# Patient Record
Sex: FEMALE | Race: WHITE
[De-identification: names, ages, dates, MRNs, and addresses within clinical notes are randomized per-mention and may not be internally consistent; named-entity substitution may affect disease eponyms.]

---

## 2019-10-17 ENCOUNTER — HOSPITAL ENCOUNTER (OUTPATIENT)
Dept: HOSPITAL 84 - D.HCCARDIO | Age: 54
Discharge: HOME | End: 2019-10-17
Attending: INTERNAL MEDICINE
Payer: COMMERCIAL

## 2019-10-17 VITALS — BODY MASS INDEX: 22.5 KG/M2

## 2019-10-17 DIAGNOSIS — I20.9: Primary | ICD-10-CM

## 2019-10-18 NOTE — ST
PATIENT:LEVON HIDALGO                            MEDICAL RECORD: [id number]
SEX: F                                                   LOCATION:[facility]         
ORDER #:                                                 ADMISSION DATE: 10/17/19
AGE OF PATIENT: 54            
 
REFERRING PHYSICIAN:                                                             
 
INTERPRETING PHYSICIAN: YISEL MANCIA MD             

 
 
DATE OF SERVICE:  10/17/2019
 
PROCEDURE:  Nuclear stress test.
 
INDICATIONS:  Angina, hyperlipidemia.
 
The patient was exercised on standard Kofi protocol for 10 minutes achieving
85% of max target heart rate response with 33 mCi of sestamibi injected at peak
stress, 11 mCi used previously for rest images.
 
FINDINGS:  Gated SPECT reveals a preserved ejection fraction of greater than 80%
with good wall motion and thickening and brightening throughout all segments. 
SPECT imaging:  Cardiolite was used as myocardial perfusion agent.  There is
reversible ischemia anteriorly, inferiorly, and apically.  The anterior includes
the basal, mid, apical anterior segments.  This is mild reversibility. 
Inferiorly includes basal, mid, apical inferior segments as well as the apex
itself.  The degree of reversibility is moderate inferiorly.  The amount of
myocardium involved between the two is large.
 
OVERALL IMPRESSION:  This is an abnormal nuclear stress test that is high risk. 
Large area of reversible ischemia anteriorly, inferiorly, and apically
suggestive of multivessel coronary artery disease.
 
TRANSINT:[id number] Voice Confirmation ID: [id number] DOCUMENT ID: [id number]
 
 
                                           
                                           YISEL MANCIA MD             
 
 
 
Electronically Signed by YISEL MANCIA on 10/18/19 at 1329
 
 
 
 
 
 
 
CC:                                                             [id number]
DICTATION DATE: 10/17/19 1524     :     10/18/19 0523      [facility] 
                                                                      10/17/19
[place] [id number] [place] [id number]

## 2019-11-01 ENCOUNTER — HOSPITAL ENCOUNTER (OUTPATIENT)
Dept: HOSPITAL 84 - D.CATH | Age: 54
Discharge: HOME | End: 2019-11-01
Attending: INTERNAL MEDICINE
Payer: MEDICAID

## 2019-11-01 VITALS — WEIGHT: 135.28 LBS | BODY MASS INDEX: 22.54 KG/M2 | HEIGHT: 65 IN

## 2019-11-01 VITALS — SYSTOLIC BLOOD PRESSURE: 114 MMHG | DIASTOLIC BLOOD PRESSURE: 75 MMHG

## 2019-11-01 DIAGNOSIS — I25.110: Primary | ICD-10-CM

## 2019-11-01 DIAGNOSIS — R94.30: ICD-10-CM

## 2019-11-01 LAB
ALT SERPL-CCNC: 23 U/L (ref 10–68)
ANION GAP SERPL CALC-SCNC: 13.6 MMOL/L (ref 8–16)
BASOPHILS NFR BLD AUTO: 0.6 % (ref 0–2)
BUN SERPL-MCNC: 10 MG/DL (ref 7–18)
CALCIUM SERPL-MCNC: 9 MG/DL (ref 8.5–10.1)
CHLORIDE SERPL-SCNC: 96 MMOL/L (ref 98–107)
CHOLEST/HDLC SERPL: 3.7 RATIO (ref 2.3–4.1)
CO2 SERPL-SCNC: 28.4 MMOL/L (ref 21–32)
CREAT SERPL-MCNC: 0.7 MG/DL (ref 0.6–1.3)
EOSINOPHIL NFR BLD: 1.4 % (ref 0–7)
ERYTHROCYTE [DISTWIDTH] IN BLOOD BY AUTOMATED COUNT: 12.6 % (ref 11.5–14.5)
GLUCOSE SERPL-MCNC: 448 MG/DL (ref 74–106)
HCT VFR BLD CALC: 46.8 % (ref 36–48)
HDLC SERPL-MCNC: 69 MG/DL (ref 32–96)
HGB BLD-MCNC: 15.8 G/DL (ref 12–16)
IMM GRANULOCYTES NFR BLD: 0.3 % (ref 0–5)
LDL-HDL RATIO: 2.4 RATIO (ref 1.5–3.5)
LDLC SERPL-MCNC: 165 MG/DL (ref 0–100)
LYMPHOCYTES NFR BLD AUTO: 32.8 % (ref 15–50)
MCH RBC QN AUTO: 30.6 PG (ref 26–34)
MCHC RBC AUTO-ENTMCNC: 33.8 G/DL (ref 31–37)
MCV RBC: 90.5 FL (ref 80–100)
MONOCYTES NFR BLD: 6 % (ref 2–11)
NEUTROPHILS NFR BLD AUTO: 58.9 % (ref 40–80)
OSMOLALITY SERPL CALC.SUM OF ELEC: 285 MOSM/KG (ref 275–300)
PLATELET # BLD: 291 10X3/UL (ref 130–400)
PMV BLD AUTO: 10 FL (ref 7.4–10.4)
POTASSIUM SERPL-SCNC: 4 MMOL/L (ref 3.5–5.1)
RBC # BLD AUTO: 5.17 10X6/UL (ref 4–5.4)
SODIUM SERPL-SCNC: 134 MMOL/L (ref 136–145)
TRIGL SERPL-MCNC: 90 MG/DL (ref 30–200)
WBC # BLD AUTO: 10.6 10X3/UL (ref 4.8–10.8)

## 2019-11-01 PROCEDURE — 93458 L HRT ARTERY/VENTRICLE ANGIO: CPT

## 2019-11-01 PROCEDURE — 93572 IV DOP VEL&/PRESS C FLO EA: CPT

## 2019-11-01 PROCEDURE — 93571 IV DOP VEL&/PRESS C FLO 1ST: CPT

## 2019-11-01 NOTE — NUR
PT SLEEPING, AWAKENS EASILY AND DENIES ANY C/O. Z BAND IS CDI,
FINGERS WARM AND PULSES PALPABLE. DENIES ANY C/O CHEST PAIN, NO
FAMILY AT BEDSIDE, CALL LIGHT IS IN REACH.

## 2019-11-01 NOTE — NUR
1610 4 CC OF AIR WEANED FROM Z BAND WITH NO BLEEDING NOTED. FINGERS
WARM AND PULSES PALPABLE. SANDWICH AND PO FLUIDS SERVED.
1630 4 CC OF AIR WEANED FROM Z ABDN WTIH NO BLEEDING NOTED. PT JOSIANE
100% OF SANDWICH WITH NO NAUSEA. NSR, RATE IS 79, DENIES ANY C/O
CHEST PAIN.

## 2019-11-01 NOTE — NUR
1320 RECEIVED PT FROM CATH LAB. PT IS SLEEPING, AWAKENS EASILY TO
VERBAL AND DENIES ANY C/O PAIN OR NAUSEA. Z BAND IS CDI TO RIGHT
WRIST, WRIST IMMOBILIZER IN PLACE. FINGERS WARM AND CAP REFILL IS
BRISK. NSR, RATE IS 74. BP /68. BED IS LOCKED AND LOW, SIDE
RAILS UP X2, CALL LIGHT IN REACH, NO FAMILY AT BEDSIDE.
1340 PT SLEEPING, AWAKENS EASILY, DENIES ANY C/O PAIN OR NAUSEA,
Z BAND IS CDI, PULSES PALPABLE. CAP REFILL IS BRISK.
1400 VSS, Z BAND IS CDI, PULSES PALPABLE. CALL LIGHT IN REACH.

## 2019-11-01 NOTE — NUR
1645 DC INSTRUCTIONS REVIEWED WITH PT AND  WHO VERBALIZE
UNDERSTANDING. PLAVIX PRESCRIPTION TO PT AND PT AND  VERBALIZE
UNDERSTADING THAT PT IS TO START THIS MEDICATION TOMORROW. ALL AIR
HAS BEEN WEANED FROM Z BAND WITH NO BLEEDING NOTED. IV DC'S WITH CATH
INTACT AND PT IS DRESSING FOR DC WITH ASSIST.

## 2019-11-01 NOTE — HEMODYNAMI
PATIENT:LEVON HIDALGO                                 MEDICAL RECORD: [id number]
: [birth year]                                            LOCATION:D.CAT          
ACCT# [id number]                                       ADMISSION DATE: 19
 
 
 Generatedon:201913:11
Patient name: LEVON HIDALGO Patient #: [id number] Visit #: [id number] SSN: [id number]
[id number] : 1965
Date of study: 2019
Page: Of
Hemodynamic Procedure Report
****************************
Patient Data
Patient Demographics
Procedure consent was obtained
First Name: LEVON          Gender: Female
Last Name: GWEN            : 1965
Middle Initial: SANTO         Age: 54 year(s)
Patient #: [id number]       Race: 
Visit #: [id number]
SSN: [id number]
Accession #:
[id number]
Additional ID: [id number]
Contact details
Address: [address] [place] [address] Phone: [phone]
[address]
State: [place]
City: [facility]
Zip code: [address]
Past Medical History
Allergies: No known allergies
Admission
Admission Data
Admission Date: 2019   Admission Time: 8:59
Arrival Date: 2019     Arrival Time: 0:00
Height (in.): 64.96         BSA: 1.67 (m2)
Height (cm.): 165           BMI: 22.41 (kg/m2)
Weight (lbs.): 134.48
Weight (kg.): 61
Lab Results
Lab Result Date: 2019  Lab Result Time: 0:00
Biochemistry
Name         Units    Result                Min      Max
BUN          mg/dl    10       --(-*--)--   7        18
Creatinine   mg/dl    0.7      --(*---)--   0.6      1.3
eGFR         ml/min   90       --(*---)--   90       120
NONAFRICAN
CBC
Name         Units    Result                Min      Max
Hematocrit   %        46.8     --(-*--)--   42       54
Hemoglobin   g/dl     15.8     --(--*-)--   13.5     17.5
Procedure
Procedure Types
Cath Procedure
Diagnostic Procedure
[facility] w/Coronaries
FFR/IVUS
 
FFR Initial
Sedation Charges
Moderate Sedation up to 15 minutes
PCI Procedure
Coronary Stent
Coronary Stent Initial x2
Procedure Description
Procedure Date
Procedure Date: 2019
Procedure Start Time: 12:38
Procedure End Time: 13:08
Procedure Staff
Name                            Function
Ketan López MD                Performing Physician
Josie Cline RT               Monitor
Karen Gomez RT               Scrub
Sarahi Gay RN                Nurse
Procedure Data
Cath Procedure
Fluoroscopy
Diagnostic fluoroscopy      Total fluoroscopy Time:
time: 11.9 min              11.9 min
Diagnostic fluoroscopy      Total fluoroscopy dose: 233
dose: 233 mGy               mGy
Contrast Material
Contrast Material Type                       Amount (ml)
Isovue 300                                   158
Entry Location
Entry     Primary  Successful  Side  Size  Upsize Upsize Entry    Closure     Griffith
ccessful  Closure
Location                             (Fr)  1 (Fr) 2 (Fr) Remarks  Device        
          Remarks
Radial                         Right 6 Fr                         Mechanical
artery                               Short                        Compression
Estimated blood loss: 10 ml
Diagnostic catheters
Device Type               Used For           End Catheter
Placement
DIAGNOSTIC Chisholm 110cm 5  Procedure
Fr catheter (941177)
Procedure Complications
No complications
Procedure Medications
Medication           Administration Route Dosage
0.9% NaCl            I.V.                 100 ml/hr
Oxygen               etCO2 Nasal cannula  2 l/min
Lidocaine 2%         added to field       20
Heparin Flush Bag    added to field       2 bags
(1000units/500ml NS)
Radial Cocktail      added to field       1 syringe
(Verapamil 2mg/Nitro
400mcg/Heparin
1500units)
Versed               I.V.                 2 mg
Fentanyl             I.V.                 25 mcg
Heparin Bolus        I.V.                 4000 units
Integrilin (Bolus    I.V.                 5.6 ml
2mg/ml)
Integrilin (Bolus    wasted               4.4 ml
2mg/ml)
 
Plavix               P.O.                 600 mg
Hemodynamics
Rest
BSA: 1.67 (m2) HGB: 15.8 (g/dl) O2 Consumption: Estimated: 168.89 (ml/min) O2 Co
nsumption indexed:
Estimated:101.13 (ml/min/m) Heart Rate: 80 (bpm)
Snapshots
Pre Cath      Intra         NCS           Post Cath
Vital Signs
Time     Heart  Resp   SPO2 etCO2   NIBP       Rhythm  Pain    Sedation
Rate   (ipm)  (%)  (mmHg)  (mmHg)             Status  Level
(bpm)
12:24:46 82     19     100  31.8    128/78(99) NSR     0 (11)  10(A)
, No
pain
12:28:58 75     12     100  35.6    101/68(80) NSR     0 (11)  10(A)
, No
pain
12:33:02 71     15     100  34.9    90/58(73)  NSR     0 (11)  10(A)
, No
pain
12:37:01 69     16     99   34.1    89/57(67)  NSR     0 (11)  10(A)
, No
pain
12:40:59 77     18     99   31.8    96/63(73)  NSR     0 (11)  9(A)
, No
pain
12:45:05 65     16     98   31      87/48(64)  NSR     0 (11)  9(A)
, No
pain
12:49:04 70     16     98   30.3    88/54(71)  NSR     0 (11)  9(A)
, No
pain
12:53:10 72     17     98   32.5    76/40(67)  NSR     0 (11)  9(A)
, No
pain
12:57:06 66     15     98   31.8    90/57(76)  NSR     0 (11)  9(A)
, No
pain
13:01:05 68     16     98   31.1    100/58(75) NSR     0 (11)  10(A)
, No
pain
13:05:05 71     19     99   28.8    111/72(88) NSR     0 (11)  10(A)
, No
pain
13:09:09 70     17     100  28      119/71(91) NSR     0 (11)  10(A)
, No
pain
Medications
Time     Medication       Route   Dose    Verified Delivered Reason          Not
es    Effectiveness
by       by
12:24:10 0.9% NaCl        I.V.    100     Ketan  Sarahi     used for
ml/hr   Maribel Gay   procedure
RN
12:24:16 Oxygen           etCO2   2 l/min Ketan  Sarahi     used for
Nasal           Maribel Gay   procedure
cannula                  RN
12:24:21 Lidocaine 2%     added   20ml    Ketan Aceves   for local
to      vial    Maribel López MD  anesthetic
 
field
12:24:26 Heparin Flush    added   2 bags  Ketan Aceves   used for
Bag              to              Maribel López MD  procedure
(1000units/500ml field
NS)
12:24:31 Radial Cocktail  added   1       Ketan Aceves   used for
(Verapamil       to      syringe Maribel López MD  procedure
2mg/Nitro        field
400mcg/Heparin
1500units)
12:37:46 Versed           I.V.    2 mg    Ketan  Sarahi     for sedation
Maribel Gay
RN
12:37:55 Fentanyl         I.V.    25 mcg  Ketan  Sarahi     for sedation
Maribel Gay RN
12:48:57 Heparin Bolus    I.V.    4000    Ketan Mcclain     for             jelly
ified
units   Maribel Gay   anticoagulation with Dr. JAVIER López
12:49:12 Integrilin       I.V.    5.6 ml  Ketan Mcclain     for
(Bolus 2mg/ml)                   Maribel Gay   antiplatelet
RN        therapy
12:49:25 Integrilin       wasted  4.4 ml  Ketan Mirzaa     for
(Bolus 2mg/ml)                   Maribel Gay   antiplatelet
RN        therapy
12:49:32 Plavix           P.O.    600 mg  Ketan Mcclain     for
Maribel Gay   antiplatelet
RN        therapy
Procedure Log
Time     Note
12:04:33 Informed consent obtained and on chart
12:06:06 Procedure Status Elective Heart Cath (OP).
12:06:08 Time tracking: Regular hours (M-F 7:00 - 5:00)
12:06:12 Plan of Care:Hemodynamics will remain stable., Cardiac
rhythm will remain stable., Comfort level will be
maintained., Respiratory function will remain
adequate., Patient/ family verbilizes understanding of
procedure., Procedure tolerated without complication.,
Recovers from procedure without complications..
12:06:35 Karen ANDRES(R) sent for patient. Start room use.
12:06:52 H&P Date Dictated: 10/10/2019 Within 30 days and on
chart., H&P Addendum completed by physician on day of
procedure. (MUST COMPLETE FOR ALL OUTPATIENTS).
12:06:57 Patient allergic to No known allergies
12:07:40 Patient Weight : 134.48 lbs
12:09:28 Patient Height : 64.96 inches
12:09:35 Arrival Date: 2019 12:00:00 AM
12:10:36 Lab Result : eGFR NONAFRICAN 90 ml/min
12:10:36 Lab Result : Hemoglobin 15.8 g/dl
12:10:36 Lab Result : BUN 10 mg/dl
12:10:36 Lab Result : Creatinine 0.7 mg/dl
12:10:36 Lab Result : Hematocrit 46.8 %
12:12:23 Patient received from Pre/Post Procedure Room to CCL 3
Alert and oriented. Tansferred to table in Supine
position.
12:12:24 Warm blankets applied, and mahnaz hugger turned on for
patient comfort.
12:12:24 Correct patient and procedure confirmed by team.
12:12:25 ECG and BP/O2 sat monitors applied to patient.
 
12:12:26 Pre-procedure instructions explained to patient.
12:12:27 Pre-op teaching completed and patient verbalized
understanding.
12:12:29 Family in patients room.
12:12:30 Patient NPO since Midnight.
12:23:35 Vital chart was started
12:23:37 Baseline sample Acquired.
12:23:42 Baseline sample Acquired.
12:23:46 Rhythm: sinus rhythm
12:23:47 Full Disclosure recording started
12:23:50 Is patient on blood thinner?No
12:23:50 Patient diabetic? Yes.
12:23:53 If diabetic: On Metformin? Yes
12:24:07 If on Metformin: Last Dose? 10/31/2019
12:24:10 0.9% NaCl 100 ml/hr I.V. was administered by Sarahi Gay RN; used for procedure; Verbal order read back
and verified.
12:24:16 Oxygen 2 l/min etCO2 Nasal cannula was administered by
Sarahi Gay RN; used for procedure; Verbal order
read back and verified.
12:24:19 Previous problem with sedation/anesthesia? No ?
12:24:20 Snore? Yes
12:24:21 Lidocaine 2% 20ml vial added to field was administered
by Ketan López MD; for local anesthetic; Verbal
order read back and verified.
12:24:21 Sleep apnea? No
12:24:22 Deviated septum? No
12:24:23 Opens mouth fully? Yes
12:24:24 Sticks out tongue? Yes
12:24:26 Heparin Flush Bag (1000units/500ml NS) 2 bags added to
field was administered by Ketan López MD; used for
procedure; Verbal order read back and verified.
12:24:29 Airway obstruction? No ?
12:24:31 Radial Cocktail (Verapamil 2mg/Nitro 400mcg/Heparin
1500units) 1 syringe added to field was administered
by Ketan López MD; used for procedure; Verbal order
read back and verified.
12:24:32 Dentures? No ?
12:24:34 Modified Zak's test Ulnar < 7 seconds
12:24:36 Patient pain scale 0/10 ?.
12:24:40 IV patent on arrival in left hand with 0.9% NaCl at
KVO.
12:24:42 Lab results completed and on chart.
12:25:13 Stress Test: yes; abnormal MULTIVESSEL
12:25:18 Right Radial & Right Groin area was prepped with
chlora-prep and draped in sterile fashion
12:25:18 Alarms reviewed by R. N.
12:25:19 Sharps counted by scrub and verified by R.N.
12:25:22 Use device set Radial Dx or PCI
12:25:23 ACIST Syringe (35901) opened to sterile field.
12:25:24 Bag Decanter (2002S) opened to sterile field.
12:25:24 ACIST Hand Control (89220) opened to sterile field.
12:25:24 ACIST Manifold (20038) opened to sterile field.
12:25:25 Tegaderm 4 x 4 (1626W) opened to sterile field.
12:25:26 Medline Cath Pack ([id number]) opened to sterile field.
12:25:26 MBrace Wrist Support ([id number]) opened to sterile
field.
12:25:27 EMERALD Guide Wire (891-555) opened to sterile field.
12:25:27 SHEATH 6FR RAIN (9379422) opened to sterile field.
12:28:53 Risk of Mortality: .1
 
12:28:56 Risk of blood transfusion: 1.4
12:28:59 Risk of LESLIE: .4
12:37:04 --------ALL STOP TIME OUT------
12:37:04 Final Timeout: patient, procedure, and site verified
with staff and physician. All members of the team are
in agreement.
12:37:08 Right Radial & Right Groin site verified by team.
12:37:12 Fire Safety Assessment: A--An alcohol-based skin
anteseptic being used preoperatively., C--Open oxygen
or nitrous oxide is being used., D--An ESU, laser, or
fiber-optic light is being used.
12:37:19 Physical assessment completed. ASA score P 2 - A
patient with mild systemic disease as per Ketan López MD.
12:37:22 1) 90+ Normal kidney functon but urine findings or
structural abnormalities or genetic trait point to
kidney disease.
12:37:24 Maximum allowable contrast dose (3.7 X eGFR X 0.75)250
ml.
12:37:28 Sedation plan: IV Moderate Sedation Medication:Versed,
Fentanyl
12:37:46 Versed 2 mg I.V. was administered by Sarahi Gay RN;
for sedation; Verbal order read back and verified.
12:37:55 Fentanyl 25 mcg I.V. was administered by Sarahi Gay
RN; for sedation; Verbal order read back and verified.
12:37:55 Procedure started.
12:38:02 Local anesthetic to right radial artery with Lidocaine
2% by Ketan López MD.**INITIAL ACCESS ONLY**
12:38:16 Zero performed for pressure channel P1
12:38:35 Zero performed for pressure channel P1
12:39:10 A 6 Fr Short sheath was inserted into the Right Radial
artery
12:39:58 A DIAGNOSTIC Tiger 110cm 5 Fr catheter ([id number]) was
advanced over the wire and used for Procedure.
12:40:35 LV gram done using JIMÉNEZ
12:40:37 Injector settings: Ml/sec: 5, Volume: 15,
12:40:49 EF : 60 %
12:41:54 LCA angiography performed.
12:42:38 RCA angiography performed.
12:42:47 Catheter exchanged over wire.
12:43:11 Volcano Verrata Plus pressure wire ([id number]) opened to
sterile field.
12:43:14 INFLATOR Merit BasixCompak (GF2734) opened to sterile
field.
12:43:56 GUIDE 6FR AR 1.0 catheter ([id number]) opened to sterile
field.
12:44:50 6 Fr AR 1 guide catheter was inserted over the wire
12:45:44 FFR/IFR wire advanced.
12:46:20 Wire advanced across lesion.
12:46:35 mRCA lesion measured at .90 with IFR
12:47:18 Wire removed.
12:47:20 Guide catheter removed.
12:47:30 GUIDE 6FR XBC 3 ([id number]) opened to sterile field.
12:47:46 6 Fr XBC 3 guide catheter was inserted over the wire
12:48:57 Heparin Bolus 4000 units I.V. was administered by
Sarahi Gay RN; for anticoagulation; verified with
Dr. López Verbal order read back and verified.
12:49:12 Integrilin (Bolus 2mg/ml) 5.6 ml I.V. was administered
by Sarahi Gay RN; for antiplatelet therapy; Verbal
order read back and verified.
 
12:49:25 Integrilin (Bolus 2mg/ml) 4.4 ml wasted was
administered by Sarahi Gay RN; for antiplatelet
therapy; Verbal order read back and verified.
12:49:32 Plavix 600 mg P.O. was administered by Sarahi Gay
RN; for antiplatelet therapy; Verbal order read back
and verified.
12:49:53 FFR/IFR wire advanced.
12:50:48 Wire advanced across lesion.
12:50:55 mLAD lesion measured at .77 with IFR
12:52:22 WIRE REDIRECTED TO THE CX
12:53:43 Wire advanced across lesion.
12:56:38 Pre PCI Site: Native mCirc has 90% stenosis.
12:58:09 Place stent Inflation Number: 1 A BILLY RX 3.0 x 22
stent ([id number]) was prepped and advanced across
the Mid CX . The stent was deployed at 13 MAURICIO for 0:10
(min:sec) .
12:58:24 Stent catheter was removed intact over wire.
13:00:30 WHISPER 190cm wire ([id number]) opened to sterile
field.
13:01:11 WHISPER 190 wire advanced.
13:02:37 Wire advanced across lesion.
13:02:59 Pre PCI Site: Native mLAD has 70% stenosis.
13:03:52 ACT drawn and resulted at >400 seconds. (normal
therapeutic range 180-240 seconds).
13:03:57 Place stent Inflation Number: 1 A BILLY RX 2.5 x 15
stent ([id number]) was prepped and advanced across
the Mid LAD . The stent was deployed at 13 MAURICIO for
0:10 (min:sec) .
13:04:06 Stent catheter was removed intact over wire.
13:04:07 Balloon removed over the wire.
13:04:08 Guide catheter removed.
13:04:51 ZEPHYR REGULAR TR BAND (359787) opened to sterile
field.
13:04:55 Procedure ended.(Physican Out)
13:05:15 Sheath removed intact; hemostasis achieved with
Mechanical Compression to the Right Radial artery.
13:06:03 Fluoroscopy time 11.90 minutes.
13:06:08 Fluoroscopy dose: 233 mGy
13:06:08 Flurop Dose total: 233
13:06:32 Dose Area Product 1594.52 mGy/cm.
13:06:57 Contrast amount:Isovue 300 158ml.
13:07:01 Maximum allowable dose exceeded? No.
13:07:02 Sharps counted by scrub and verified by R.N.
13:07:04 [place] band inflated with 10cc of air.
13:07:05 Insertion/operative site no bleeding no hematoma.
13:07:19 Post-procedure physical assessment completed. ASA
score P 2 - A patient with mild systemic disease as
per Ketan López MD.
13:07:21 Post procedure rhythm: sinus rhythm
13:07:25 Estimated blood loss: 10 ml
13:07:26 Post procedure instruction explained to
patient.Patient verbalizes understanding.
13:07:26 Patient needs reinforcement of post procedure
teaching.
13:07:56 Procedure type changed to Cath procedure, Diagnostic
procedure, LHC, C w/Coronaries, FFR/IVUS, FFR
Initial, Sedation Charges, Moderate Sedation up to 15
minutes, PCI procedure, Coronary Stent, Coronary Stent
Initial x2
13:08:27 Procedure and supply charges have been captured,
 
reviewed, submitted and are correct.
13:08:30 Procedure Complication : No complications
13:08:32 Vital chart was stopped
13:08:34 [facility] Findings: MVD- PCI performed (see procedure note)
13:08:37 Operative report dictated upon procedure completion.
13:08:37 See physician's report for complete and final results.
13:08:39 Report given to Pre/Post Procedure Room.
13:08:41 Patient transfered to Pre/Post Procedure Room with
Bed.
13:08:43 Procedure ended.
13:08:43 Full Disclosure recording stopped
13:08:49 End room use (Document Last)
13:10:40 End room use (Document Last)
13:11:15 End room use (Document Last)
Intervention Summary
Intervention Notes
Time     ActionType  Lesion and  Equipment Used Action#  Pressure  Duration
Attributes
12:58:09 Place stent Mid CX      BILLY RX 3.0 x  1        13        00:10
22 stent
([id number])
13:03:57 Place stent Mid LAD     BILLY RX 2.5 x  1        13        00:10
15 stent
([id number])
Device Usage
Item Name      Manufacture  Quantity  Catalog       Hospital Part       Current 
Minimal Lot# /
Number        Charge   Number     Stock   Stock   Serial#
Code
ACIST Syringe  Acist        1         61259         795782   798310     374317  
20
(94600)        Medical
Systems Inc
Bag Decanter   Microtek     1         S         581510   61167      198448  
5
(S)        Medical Inc.
ACIST Hand     Acist        1         58467         088316   213133     118098  
5
Control        Medical
(52724)        Systems Inc
ACIST Manifold Acist        1         80554         222816   775605     618714  
5
(62547)        Medical
Systems Inc
Tegaderm 4 x 4 3M           1         1626W         502126   177002     840613  
5
(1626W)
Medline Cath   Medline      1         [id number]     865410   78961      368692  
5
Pack
([id number])
MBrace Wrist   Advanced     1         140-0250-00   372092   31482      146805  
5
Support        Vascular
([id number])    Dynamics
EMERALD Guide  Cardinal     1         502-455       033506   933317     218399  
5
Wire (502455) Health
SHEATH 6FR     Cardinal     1         1767992       125232   5664028    587063  
5
 
RAIN (8763011) Health
DIAGNOSTIC     Terumo       1         405013       480242   484143     548548  
5
Tiger 110cm 5
Fr catheter
(973326)
Volcano        [place]      1         25604S        028936   251516439  249121  
5
Verrata Plus
pressure wire
(92745E)
INFLATOR Merit Merit        1         CQ5157        186055   841618     211605  
15
BasTrampoline    Medical
(AV9114)
GUIDE 6FR AR   Medtronic    1         [id number]       998510   14732      683269  
1
1.0 catheter
([id number])
GUIDE 6FR XBC  Cardinal     1         [id number]      740610   53170      296188  
5
3 (67179296)   Health
BILLY RX 3.0 x  Medtronic    1         [id number]  485857   7457249    624529  
5       [id number]
22 stent
([id number])
WHISPER 190cm  Buchanan       1         [id number]     001079   900449     664232  
5
wire           Vascular
([id number])
BILLY RX 2.5 x  Medtronic    1         [id number]  980688   2890450    916605  
5       [id number]
15 stent
([id number])
ZEPHYR REGULAR Cardinal     1         586428        498905   5752755    330578  
5
TR BAND        Indiewalls
(767055)
Signature Audit Maud
Stage           Time        Signature      Unsigned
Intra-Procedure 2019   Josie Cline
1:10:40 PM  RT(R)
Intra-Procedure 2019   Sarahi Gay
1:11:16 PM  RN
Intra-Procedure 2019   Ketan López
1:11:35 PM  MD
 
 
 
 
 
 
 
 
 
[facility]                                          
[address] [place] [address]

## 2019-11-01 NOTE — NUR
PT SLEEPING, AWAKENS EASILY TO VERBAL, DENIES ANY C/O. STATES 'JUST
WANTS TO SLEEP'
Z BAND IS CDI, FINGERS WARM AND PULSES PALPABLE. PT DENIES ANY NV
DEFICIT TO HAND.

## 2019-11-01 NOTE — NUR
2X2 AND TEGADERM REMAIN CDI TO RIGHT WRIST. WRIST IMMOBILIZER IN
PLACE. PT DENIES ANY C/O PAIN, NAUSEA OR NV DEFICIT TO RIGHT HAND. PT
HAS ALL PERSONAL BELONGINGS AND DC INSTRUCTIONS AT TIME OF DISCHARGE.
PT ESCORTED TO PRIVATE AUTO VIA WC BY NURSE WITH  DRIVING
HER HOME.

## 2019-11-06 NOTE — OP
PATIENT NAME:  LEVON HIDALGO                          MEDICAL RECORD: [id number]
:69                                             LOCATION:D.CAT          
                                                         ADMISSION DATE:        
SURGEON:  YISEL MANCIA MD             
 
 
DATE OF OPERATION:  2019
 
PROCEDURES:
1.  PTCA stent left circumflex.
2.  PTCA stent LAD.
3.  IFR RCA.
4.  IFR LAD.
5.  Left heart catheterization.
6.  Selective coronary angiography.
7.  Left ventriculogram.
 
INDICATION:  Angina, coronary artery disease, abnormal nuclear stress test.
 
PROCEDURE IN DETAIL:  After informed consent was obtained and after a detailed
description of risks, benefits as well as alternative therapies, the patient
elected to proceed with angiogram and angioplasty.  The right radial area was
prepped and draped in normal sterile fashion.  Right radial artery was
cannulated via modified Seldinger technique with placement of 6-[place] sheath. 
All catheters exchanged through this sheath.
 
FINDINGS:  Left ventriculogram was performed in a standard 30-degree JIMÉNEZ view,
reveals good cardiac wall motion, ejection fraction 50%.
 
SELECTIVE CORONARY ANGIOGRAPHY:
1.  Left main is with no significant angiographic disease.
2.  Left anterior descending has 70% stenosis in the mid vessel.  An IFR is
abnormal at 0.74.
3.  The right coronary has a questionable 70% stenosis in the proximal vessel;
however, IFR was normal at 0.90.
4.  Left circumflex had 85 plus percent lesion in the mid vessel.  We attempted
IFR on this vessel as well; however, the IFR wire would no longer give a signal
and it was difficult to wire due to multiple curves prior to the lesion and
hence, the decision was made to continue with intervention.  The circumflex was
addressed with a 3.0 x 22 mm [place] and the LAD with a 2.5 x 15 mm [place].  Result
was 0% residual stenosis.
 
OVERALL IMPRESSION:  Successful percutaneous transluminal coronary angioplasty
stent of the left anterior descending and circumflex, both going from 70% to 85%
initial stenosis to 0% residual stenosis.
 
TRANSINT:[id number] Voice Confirmation ID: [id number] DOCUMENT ID: [id number]
                                           
                                           YISEL MANCIA MD             
 
 
 
Electronically Signed by YISEL MANCIA on 19 at 1407
CC:                                                             [id number]
DICTATION DATE: 19 1313     :     19 1454      DEP CLI 
                                                                      19
[id number] [facility] [place] [address]

## 2020-05-22 ENCOUNTER — HOSPITAL ENCOUNTER (OUTPATIENT)
Dept: HOSPITAL 84 - D.HCCECHO | Age: 55
Discharge: HOME | End: 2020-05-22
Attending: FAMILY MEDICINE
Payer: MEDICAID

## 2020-05-22 VITALS — BODY MASS INDEX: 22.5 KG/M2

## 2020-05-22 DIAGNOSIS — R60.0: Primary | ICD-10-CM

## 2020-06-08 ENCOUNTER — HOSPITAL ENCOUNTER (OUTPATIENT)
Dept: HOSPITAL 84 - D.LABREF | Age: 55
Discharge: HOME | End: 2020-06-08
Attending: NURSE PRACTITIONER
Payer: MEDICAID

## 2020-06-08 VITALS — BODY MASS INDEX: 22.5 KG/M2

## 2020-06-08 DIAGNOSIS — E78.5: Primary | ICD-10-CM

## 2020-06-08 LAB
ALT SERPL-CCNC: 16 U/L (ref 10–68)
CHOLEST/HDLC SERPL: 2.4 RATIO (ref 2.3–4.1)
HDLC SERPL-MCNC: 52 MG/DL (ref 32–96)
LDL-HDL RATIO: 1 RATIO (ref 1.5–3.5)
LDLC SERPL-MCNC: 54 MG/DL (ref 0–100)
TRIGL SERPL-MCNC: 89 MG/DL (ref 30–200)